# Patient Record
Sex: FEMALE | Race: WHITE | NOT HISPANIC OR LATINO | Employment: FULL TIME | URBAN - METROPOLITAN AREA
[De-identification: names, ages, dates, MRNs, and addresses within clinical notes are randomized per-mention and may not be internally consistent; named-entity substitution may affect disease eponyms.]

---

## 2018-10-21 ENCOUNTER — APPOINTMENT (OUTPATIENT)
Dept: RADIOLOGY | Facility: IMAGING CENTER | Age: 23
End: 2018-10-21
Attending: NURSE PRACTITIONER
Payer: OTHER GOVERNMENT

## 2018-10-21 ENCOUNTER — OFFICE VISIT (OUTPATIENT)
Dept: URGENT CARE | Facility: PHYSICIAN GROUP | Age: 23
End: 2018-10-21
Payer: OTHER GOVERNMENT

## 2018-10-21 VITALS
HEIGHT: 62 IN | HEART RATE: 103 BPM | RESPIRATION RATE: 15 BRPM | SYSTOLIC BLOOD PRESSURE: 116 MMHG | BODY MASS INDEX: 26.87 KG/M2 | OXYGEN SATURATION: 98 % | WEIGHT: 146 LBS | DIASTOLIC BLOOD PRESSURE: 70 MMHG | TEMPERATURE: 98.9 F

## 2018-10-21 DIAGNOSIS — M25.561 ACUTE PAIN OF RIGHT KNEE: ICD-10-CM

## 2018-10-21 PROCEDURE — 99202 OFFICE O/P NEW SF 15 MIN: CPT | Performed by: NURSE PRACTITIONER

## 2018-10-21 PROCEDURE — 73564 X-RAY EXAM KNEE 4 OR MORE: CPT | Mod: 26,RT | Performed by: NURSE PRACTITIONER

## 2018-10-21 NOTE — PROGRESS NOTES
"Subjective:      Silvia Butler is a 23 y.o. female who presents with Knee Injury (right knee pain and swelling x2 weeks ago fell and hurt knee )  Patient comes in today with right knee pain.  She has a history of a \"blown out\" knee from track as a teen.  She has never had surgery.  She reports that at times her knee catches, locks, feels like it will give out.  She fell 2 weeks ago when she tripped, and again [place] today when her knee felt weak and she fell against her car.  She thinks she aggravated her old injury.  She has never had MRI done to confirm ligamentous or meniscal tear.  She lives here part time and in [place] part time.  She is going to [place] tomorrow and plans to follow up with ortho.            [facility]    Review of Systems   Musculoskeletal: Positive for falls and joint pain.   Neurological: Negative for tingling and sensory change.     Medications, Allergies, and current problem list reviewed today in Epic     Objective:     /70   Pulse (!) 103   Temp 37.2 °C (98.9 °F) (Temporal)   Resp 15   Ht 1.575 m (5' 2\")   Wt 66.2 kg (146 lb)   SpO2 98%   BMI 26.70 kg/m²      Physical Exam   Constitutional: She is oriented to person, place, and time. She appears well-developed and well-nourished. No distress.   Cardiovascular: Normal rate.    Pulmonary/Chest: Effort normal.   Musculoskeletal:   Right knee is warm, dry, and intact with healing abrasions on the anterior joint region.  No erythema.  Patella tracks midline.  No joint instability.  ROM intact with pain on full extension and full flexion.  Pain with varus and valgus stress.  Pain on anterior and posterior drawer test.  No gait abnormalities.  NV, sensation, and strength intact.   Neurological: She is alert and oriented to person, place, and time.   Skin: She is not diaphoretic.   Vitals reviewed.         [address] [address] Silvia Butler  MRN: [id number], : 1995, Sex: F  Encounter " date: 10/21/2018   Order   DX-KNEE COMPLETE 4+ RIGHT [[id number]] (Order [id number])   Reviewed by RONNIE Banuelos on 10/21/2018  4:44 PM   Images     Show images for DX-KNEE COMPLETE 4+ RIGHT   View SmartLink Info     DX-KNEE COMPLETE 4+ (Order #[id number]) on 10/21/18   Narrative       10/21/2018 4:28 PM    HISTORY/REASON FOR EXAM:  Knee pain.    TECHNIQUE/EXAM DESCRIPTION AND NUMBER OF VIEWS: 4 views of the RIGHT knee.    COMPARISON: None    FINDINGS:  Bone density is normal. There is no evidence of fracture or dislocation. There is no evidence of arthropathy. There is no joint effusion.   Impression       No evidence of acute fracture or dislocation.   Reading Provider Reading Date   Aristeo Smith M.D. Oct 21, 2018   Signing Provider Signing Date Signing Time   Aristeo Smith M.D. Oct 21, 2018  4:42 PM   Lab Information     Lab   RADIOLOGY              Last Resulted Time   Sun Oct 21, 2018  4:44 PM   Detailed Information     Priority and Order Details           Collection Information     Resulting Agency: RAD[facility]OLOG[facility]      Order-Level Documents:     There are no order-level documents.   Order Detail Report     DX-KNEE COMPLETE 4+ (Order #[id number]) on 10/21/18   Order-Level Documents:     There are no order-level documents.   Encounter-Level Documents:     There are no encounter-level documents.   Order-Level Documents for Parent Order:     There are no order-level documents for parent order.   Encounter-Level Documents for Parent Order:     There are no encounter-level documents for parent order.   DX-KNEE COMPLETE 4+ RIGHT [[id number]]     Electronically signed by: RONNIE Landry on 10/21/18 1621 Status: Completed   Ordering user: RONNIE Landry 10/21/18 1621 Authorized by: RONNIE Landry   Ordered during: Office Visit on 10/21/2018   Frequency:  10/21/18 -     Diagnoses  Acute pain of right knee [M25.561]   Questionnaire     Question Answer   Left/Right  Indicator: RIGHT   Reason For Exam: Pain/Deformity Following Trauma   Is the patient pregnant? No   Comments to Radiology Fell two weeks ago.  Persistent anterior knee pain distal to the patella.          Assessment/Plan:     1. Acute pain of right knee  - DX-KNEE COMPLETE 4+ RIGHT; Future    Discussed exam findings and imaging results with patient; differential reviewed.  Follow up recommended with orthopaedics.  She will follow up in [place].  Declined crutches.  ACE wrap prn.  OTC analgesics prn.  Patient verbalized understanding of and agreed with plan of care.

## 2018-10-23 ASSESSMENT — ENCOUNTER SYMPTOMS
SENSORY CHANGE: 0
TINGLING: 0
FALLS: 1